# Patient Record
Sex: MALE | Race: ASIAN | NOT HISPANIC OR LATINO | URBAN - METROPOLITAN AREA
[De-identification: names, ages, dates, MRNs, and addresses within clinical notes are randomized per-mention and may not be internally consistent; named-entity substitution may affect disease eponyms.]

---

## 2022-03-07 ENCOUNTER — WEB ENCOUNTER (OUTPATIENT)
Dept: URBAN - METROPOLITAN AREA CLINIC 115 | Facility: CLINIC | Age: 37
End: 2022-03-07

## 2022-03-07 ENCOUNTER — OFFICE VISIT (OUTPATIENT)
Dept: URBAN - METROPOLITAN AREA CLINIC 115 | Facility: CLINIC | Age: 37
End: 2022-03-07
Payer: COMMERCIAL

## 2022-03-07 VITALS
HEART RATE: 61 BPM | SYSTOLIC BLOOD PRESSURE: 103 MMHG | BODY MASS INDEX: 22.18 KG/M2 | HEIGHT: 66 IN | WEIGHT: 138 LBS | DIASTOLIC BLOOD PRESSURE: 69 MMHG | TEMPERATURE: 98.2 F

## 2022-03-07 DIAGNOSIS — K62.5 RECTAL BLEEDING: ICD-10-CM

## 2022-03-07 DIAGNOSIS — K64.8 HEMORRHOID PROLAPSE: ICD-10-CM

## 2022-03-07 DIAGNOSIS — K59.4 ANORECTAL SPASM: ICD-10-CM

## 2022-03-07 PROCEDURE — 99203 OFFICE O/P NEW LOW 30 MIN: CPT | Performed by: INTERNAL MEDICINE

## 2022-03-07 RX ORDER — CHOLECALCIFEROL TAB 50 MCG (2000 UNIT) 50 MCG
ONCE PER WEEK TAB ORAL
Status: ACTIVE | COMMUNITY

## 2022-03-07 NOTE — PHYSICAL EXAM GASTROINTESTINAL
Abdomen , soft, nontender, nondistended , no guarding or rigidity , no masses palpable , normal bowel sounds , Liver and Spleen , no hepatomegaly present , no hepatosplenomegaly , liver nontender , spleen not palpable Rectum severe anorectal spasm.  Small prolapsed hemorrhoid.  Minor rectal bleeding during exam.  No palpable fissure.  Unable to introduce anoscopy due to spasm.

## 2022-03-11 ENCOUNTER — TELEPHONE ENCOUNTER (OUTPATIENT)
Dept: URBAN - METROPOLITAN AREA CLINIC 115 | Facility: CLINIC | Age: 37
End: 2022-03-11

## 2022-04-18 ENCOUNTER — OFFICE VISIT (OUTPATIENT)
Dept: URBAN - METROPOLITAN AREA CLINIC 115 | Facility: CLINIC | Age: 37
End: 2022-04-18
Payer: COMMERCIAL

## 2022-04-18 VITALS
DIASTOLIC BLOOD PRESSURE: 74 MMHG | SYSTOLIC BLOOD PRESSURE: 115 MMHG | HEART RATE: 60 BPM | WEIGHT: 142 LBS | TEMPERATURE: 97.7 F | BODY MASS INDEX: 22.82 KG/M2 | HEIGHT: 66 IN

## 2022-04-18 DIAGNOSIS — K64.8 HEMORRHOID PROLAPSE: ICD-10-CM

## 2022-04-18 DIAGNOSIS — K59.4 ANORECTAL SPASM: ICD-10-CM

## 2022-04-18 DIAGNOSIS — K62.5 RECTAL BLEEDING: ICD-10-CM

## 2022-04-18 PROCEDURE — 99213 OFFICE O/P EST LOW 20 MIN: CPT | Performed by: INTERNAL MEDICINE

## 2022-04-18 RX ORDER — CHOLECALCIFEROL TAB 50 MCG (2000 UNIT) 50 MCG
ONCE PER WEEK TAB ORAL
Status: ON HOLD | COMMUNITY

## 2022-04-18 NOTE — HPI-TODAY'S VISIT:
37 y/o  male presents returns in follow up of anorectal spasma and rectal bleeding.  Last year had hemorrhoid prolapse, had rubber band ligation by Dr. Estrella.  During banding procedure had sharp pain then blood in semen after for several days.  Had relief of hemorrhoids for a short period of time but now having hemorrhoid prolapse again.  Will have to push back in and sometimes bright red blood on TP.   Has nocturnal urination.  Last office visit found significant anorectal spasm.  Prescribed sitz baths and nifedipine (gets migraines so no Nitro).  Today he reports somewhat better.  Bleeding had stopped.  Did have constipation with straining with return of bleeding that has since resolved.

## 2022-06-13 ENCOUNTER — LAB OUTSIDE AN ENCOUNTER (OUTPATIENT)
Dept: URBAN - METROPOLITAN AREA CLINIC 115 | Facility: CLINIC | Age: 37
End: 2022-06-13

## 2022-06-13 ENCOUNTER — CLAIMS CREATED FROM THE CLAIM WINDOW (OUTPATIENT)
Dept: URBAN - METROPOLITAN AREA CLINIC 115 | Facility: CLINIC | Age: 37
End: 2022-06-13
Payer: COMMERCIAL

## 2022-06-13 VITALS
DIASTOLIC BLOOD PRESSURE: 73 MMHG | TEMPERATURE: 97.3 F | SYSTOLIC BLOOD PRESSURE: 113 MMHG | WEIGHT: 140.8 LBS | HEART RATE: 56 BPM | BODY MASS INDEX: 22.63 KG/M2 | HEIGHT: 66 IN

## 2022-06-13 DIAGNOSIS — K62.5 RECTAL BLEEDING: ICD-10-CM

## 2022-06-13 DIAGNOSIS — K64.8 HEMORRHOID PROLAPSE: ICD-10-CM

## 2022-06-13 DIAGNOSIS — K59.4 ANORECTAL SPASM: ICD-10-CM

## 2022-06-13 PROCEDURE — 99213 OFFICE O/P EST LOW 20 MIN: CPT | Performed by: INTERNAL MEDICINE

## 2022-06-13 RX ORDER — CHOLECALCIFEROL TAB 50 MCG (2000 UNIT) 50 MCG
ONCE PER WEEK TAB ORAL
Status: ON HOLD | COMMUNITY

## 2022-06-13 NOTE — HPI-TODAY'S VISIT:
35 y/o  male presents returns in follow up of anorectal spasms and rectal bleeding.  Last year had hemorrhoid prolapse, had rubber band ligation by Dr. Estrella.  During banding procedure had sharp pain then blood in semen after for several days.  When last seen 04/2022 reported relief of hemorrhoids for a short period of time return of prolapse.  Has nocturnal urination.  Anorectal spams had improved somewhat with Sitz baths and nifedipine (gets migraines so no Nitro) but was still present.  I prescribed another 6 weeks of Nifedipine.  Today reports continued prolapse of hemorrhoids, even with Sitz baths.  Very minimal bleeding now.  No pain.  Saw Urologist, states was told all is OK.  He has not yet had colonoscopy.

## 2022-06-16 ENCOUNTER — OFFICE VISIT (OUTPATIENT)
Dept: URBAN - METROPOLITAN AREA SURGERY CENTER 13 | Facility: SURGERY CENTER | Age: 37
End: 2022-06-16

## 2022-06-27 ENCOUNTER — OFFICE VISIT (OUTPATIENT)
Dept: URBAN - METROPOLITAN AREA CLINIC 115 | Facility: CLINIC | Age: 37
End: 2022-06-27

## 2022-07-06 ENCOUNTER — TELEPHONE ENCOUNTER (OUTPATIENT)
Dept: URBAN - METROPOLITAN AREA CLINIC 82 | Facility: CLINIC | Age: 37
End: 2022-07-06

## 2022-07-07 ENCOUNTER — TELEPHONE ENCOUNTER (OUTPATIENT)
Dept: URBAN - METROPOLITAN AREA CLINIC 115 | Facility: CLINIC | Age: 37
End: 2022-07-07

## 2022-08-16 ENCOUNTER — OFFICE VISIT (OUTPATIENT)
Dept: URBAN - METROPOLITAN AREA SURGERY CENTER 13 | Facility: SURGERY CENTER | Age: 37
End: 2022-08-16

## 2022-08-18 ENCOUNTER — CLAIMS CREATED FROM THE CLAIM WINDOW (OUTPATIENT)
Dept: URBAN - METROPOLITAN AREA SURGERY CENTER 13 | Facility: SURGERY CENTER | Age: 37
End: 2022-08-18

## 2022-08-18 ENCOUNTER — TELEPHONE ENCOUNTER (OUTPATIENT)
Dept: URBAN - METROPOLITAN AREA CLINIC 115 | Facility: CLINIC | Age: 37
End: 2022-08-18

## 2022-08-18 ENCOUNTER — CLAIMS CREATED FROM THE CLAIM WINDOW (OUTPATIENT)
Dept: URBAN - METROPOLITAN AREA SURGERY CENTER 13 | Facility: SURGERY CENTER | Age: 37
End: 2022-08-18
Payer: COMMERCIAL

## 2022-08-18 DIAGNOSIS — K64.1 BLEEDING GRADE II HEMORRHOIDS: ICD-10-CM

## 2022-08-18 DIAGNOSIS — K62.5 ANAL BLEEDING: ICD-10-CM

## 2022-08-18 PROCEDURE — 46221 LIGATION OF HEMORRHOID(S): CPT | Performed by: INTERNAL MEDICINE

## 2022-08-18 PROCEDURE — G8907 PT DOC NO EVENTS ON DISCHARG: HCPCS | Performed by: INTERNAL MEDICINE

## 2022-08-18 PROCEDURE — 45330 DIAGNOSTIC SIGMOIDOSCOPY: CPT | Performed by: INTERNAL MEDICINE

## 2022-08-22 ENCOUNTER — TELEPHONE ENCOUNTER (OUTPATIENT)
Dept: URBAN - METROPOLITAN AREA CLINIC 115 | Facility: CLINIC | Age: 37
End: 2022-08-22

## 2022-08-25 ENCOUNTER — OFFICE VISIT (OUTPATIENT)
Dept: URBAN - METROPOLITAN AREA SURGERY CENTER 13 | Facility: SURGERY CENTER | Age: 37
End: 2022-08-25

## 2022-08-31 ENCOUNTER — OFFICE VISIT (OUTPATIENT)
Dept: URBAN - METROPOLITAN AREA CLINIC 82 | Facility: CLINIC | Age: 37
End: 2022-08-31
Payer: COMMERCIAL

## 2022-08-31 VITALS
TEMPERATURE: 97.3 F | BODY MASS INDEX: 22.63 KG/M2 | DIASTOLIC BLOOD PRESSURE: 85 MMHG | HEART RATE: 56 BPM | SYSTOLIC BLOOD PRESSURE: 126 MMHG | HEIGHT: 66 IN | WEIGHT: 140.8 LBS

## 2022-08-31 DIAGNOSIS — K59.4 ANORECTAL SPASM: ICD-10-CM

## 2022-08-31 DIAGNOSIS — K62.5 RECTAL BLEEDING: ICD-10-CM

## 2022-08-31 DIAGNOSIS — K64.8 HEMORRHOID PROLAPSE: ICD-10-CM

## 2022-08-31 PROCEDURE — 46221 LIGATION OF HEMORRHOID(S): CPT | Performed by: INTERNAL MEDICINE

## 2022-08-31 PROCEDURE — 99212 OFFICE O/P EST SF 10 MIN: CPT | Performed by: INTERNAL MEDICINE

## 2022-08-31 RX ORDER — CHOLECALCIFEROL TAB 50 MCG (2000 UNIT) 50 MCG
ONCE PER WEEK TAB ORAL
Status: ON HOLD | COMMUNITY

## 2022-08-31 NOTE — PHYSICAL EXAM GASTROINTESTINAL
Abdomen , soft, nontender, nondistended , no guarding or rigidity , no masses palpable , normal bowel sounds , Liver and Spleen , no hepatomegaly present , no hepatosplenomegaly , liver nontender , spleen not palpable Rectum  moderate amount of anal sphincter spasm.

## 2022-08-31 NOTE — HPI-TODAY'S VISIT:
35 y/o male presents for hemorrhoid banding. I placed hemorrhod band on R Posterior during colonoscopy. He has already noticed improvement in symptoms.

## 2022-09-21 ENCOUNTER — OFFICE VISIT (OUTPATIENT)
Dept: URBAN - METROPOLITAN AREA CLINIC 82 | Facility: CLINIC | Age: 37
End: 2022-09-21
Payer: COMMERCIAL

## 2022-09-21 VITALS
HEIGHT: 66 IN | WEIGHT: 138.6 LBS | HEART RATE: 57 BPM | BODY MASS INDEX: 22.28 KG/M2 | SYSTOLIC BLOOD PRESSURE: 128 MMHG | DIASTOLIC BLOOD PRESSURE: 87 MMHG | TEMPERATURE: 98.4 F

## 2022-09-21 DIAGNOSIS — K59.4 ANORECTAL SPASM: ICD-10-CM

## 2022-09-21 DIAGNOSIS — K62.5 RECTAL BLEEDING: ICD-10-CM

## 2022-09-21 DIAGNOSIS — K64.1 BLEEDING GRADE II HEMORRHOIDS: ICD-10-CM

## 2022-09-21 PROCEDURE — 46221 LIGATION OF HEMORRHOID(S): CPT | Performed by: INTERNAL MEDICINE

## 2022-09-21 PROCEDURE — 99212 OFFICE O/P EST SF 10 MIN: CPT | Performed by: INTERNAL MEDICINE

## 2022-09-21 PROCEDURE — 46611 ANOSCOPY: CPT | Performed by: INTERNAL MEDICINE

## 2022-09-21 RX ORDER — CHOLECALCIFEROL TAB 50 MCG (2000 UNIT) 50 MCG
ONCE PER WEEK TAB ORAL
Status: ON HOLD | COMMUNITY

## 2022-09-21 NOTE — HPI-TODAY'S VISIT:
35 y/o male presents for hemorrhoid banding. I placed hemorrhod band on R Posterior during colonoscopy and R Anterior last visit. Today he reports having prolapse with bleeding, seems to be on left side.

## 2022-09-21 NOTE — PHYSICAL EXAM GASTROINTESTINAL
Abdomen , soft, nontender, nondistended , no guarding or rigidity , no masses palpable , normal bowel sounds , Liver and Spleen , no hepatomegaly present , no hepatosplenomegaly , liver nontender , spleen not palpable Rectum anorectal spasm. no blood in rectal vault.  Anoscopy:  RP > LL

## 2022-10-17 ENCOUNTER — OFFICE VISIT (OUTPATIENT)
Dept: URBAN - METROPOLITAN AREA CLINIC 115 | Facility: CLINIC | Age: 37
End: 2022-10-17

## 2023-03-01 ENCOUNTER — OFFICE VISIT (OUTPATIENT)
Dept: URBAN - METROPOLITAN AREA CLINIC 82 | Facility: CLINIC | Age: 38
End: 2023-03-01
Payer: COMMERCIAL

## 2023-03-01 ENCOUNTER — DASHBOARD ENCOUNTERS (OUTPATIENT)
Age: 38
End: 2023-03-01

## 2023-03-01 VITALS
WEIGHT: 143.8 LBS | SYSTOLIC BLOOD PRESSURE: 123 MMHG | RESPIRATION RATE: 16 BRPM | HEIGHT: 66 IN | HEART RATE: 72 BPM | BODY MASS INDEX: 23.11 KG/M2 | TEMPERATURE: 98.1 F | DIASTOLIC BLOOD PRESSURE: 79 MMHG

## 2023-03-01 DIAGNOSIS — K62.5 RECTAL BLEEDING: ICD-10-CM

## 2023-03-01 DIAGNOSIS — K59.4 ANORECTAL SPASM: ICD-10-CM

## 2023-03-01 DIAGNOSIS — K64.1 BLEEDING GRADE II HEMORRHOIDS: ICD-10-CM

## 2023-03-01 PROCEDURE — 99212 OFFICE O/P EST SF 10 MIN: CPT | Performed by: INTERNAL MEDICINE

## 2023-03-01 PROCEDURE — 46221 LIGATION OF HEMORRHOID(S): CPT | Performed by: INTERNAL MEDICINE

## 2023-03-01 RX ORDER — CHOLECALCIFEROL TAB 50 MCG (2000 UNIT) 50 MCG
ONCE PER WEEK TAB ORAL
Status: ON HOLD | COMMUNITY

## 2023-03-01 NOTE — HPI-TODAY'S VISIT:
36 y/o male presents for hemorrhoids. He complete banding X3 in September. He noted resolution of symptoms for months.  Has noticed in past few weeks that hemorrhoid has returned. Bright red blood after BM and rectal pressure.

## 2023-06-23 ENCOUNTER — TELEPHONE ENCOUNTER (OUTPATIENT)
Dept: URBAN - METROPOLITAN AREA CLINIC 23 | Facility: CLINIC | Age: 38
End: 2023-06-23